# Patient Record
(demographics unavailable — no encounter records)

---

## 2025-02-28 NOTE — HISTORY OF PRESENT ILLNESS
[FreeTextEntry1] : PATIENT PRESENT FOR ANNUAL GYN EXAM [TextBox_4] : pt presents for annual with no complaints hx- c/s/x 1  no menses since 2019  pt states she has a hx of fibroid uterus  [Mammogramdate] : 3/13/24 [PapSmeardate] : 2/1/24 [ColonoscopyDate] : 2/5/25

## 2025-02-28 NOTE — PLAN
[FreeTextEntry1] : annual with pap/cx/hpv  B/L mammo  TVS  Ca with vit D/C Magnesium/ healthy diet/ increase po fluids  rto annually/prn

## 2025-07-12 NOTE — ASSESSMENT
[FreeTextEntry1] : TIA/CVA - MRI of the brain  - MRA of the brain - Routine EEG - Blood work - Aspirin 81 mg oral tablets, 1 tablet QD - Continue atorvastatin -Patient educated on the importance of seeking medical care if she were to have other episodes of TIA, and she reported understanding - Follow up in 1 month   Corina COLEMAN, attest that this documentation has been prepared under the direction and in the presence of Provider Alfred Paredes DO.   Thank you for allowing me to assist in the management of this patient.   Alfred Paredes DO Board Certified, Neurology.    Total clinician time spent  is  46 minutes including preparing to see the patient, obtaining and/or reviewing and confirming history, performing a medically necessary and appropriate examination, counseling and educating the patient and/or family, documenting clinical information in the HER and communicating and/or referring to other healthcare professionals.

## 2025-07-12 NOTE — HISTORY OF PRESENT ILLNESS
[FreeTextEntry1] : It is a pleasure to see Ms. NGOZI VILLAFANA in office today. She is a 52-year-old woman who presents for a neurologic consultation due to 2 episodes of sudden slurred speech that each lasted for about 2-3 minutes. She reports that in August of 2024, she had an episode where she was unable to properly vocalize her thoughts. She states that she was with her niece at the time, and that her niece could not understand what she was saying. She reports that about 2 weeks ago she had a similar episode when home alone. She did not go to the hospital after either episode. She states that she did a US duplex carotid earlier today. She is currently taking cholesterol medication, and states that she has started taking aspirin over the last week.  
[FreeTextEntry1] : It is a pleasure to see Ms. NGOZI VILLAFANA in office today. She is a 52-year-old woman who presents for a neurologic consultation due to 2 episodes of sudden slurred speech that each lasted for about 2-3 minutes. She reports that in August of 2024, she had an episode where she was unable to properly vocalize her thoughts. She states that she was with her niece at the time, and that her niece could not understand what she was saying. She reports that about 2 weeks ago she had a similar episode when home alone. She did not go to the hospital after either episode. She states that she did a US duplex carotid earlier today. She is currently taking cholesterol medication, and states that she has started taking aspirin over the last week.  
code: adult cardiac